# Patient Record
Sex: FEMALE | Race: OTHER
[De-identification: names, ages, dates, MRNs, and addresses within clinical notes are randomized per-mention and may not be internally consistent; named-entity substitution may affect disease eponyms.]

---

## 2019-11-08 ENCOUNTER — HOSPITAL ENCOUNTER (EMERGENCY)
Dept: HOSPITAL 63 - ER | Age: 24
Discharge: HOME | End: 2019-11-08
Payer: OTHER GOVERNMENT

## 2019-11-08 VITALS — DIASTOLIC BLOOD PRESSURE: 45 MMHG | SYSTOLIC BLOOD PRESSURE: 112 MMHG

## 2019-11-08 VITALS — BODY MASS INDEX: 20.25 KG/M2 | HEIGHT: 66 IN | WEIGHT: 126 LBS

## 2019-11-08 DIAGNOSIS — O46.91: Primary | ICD-10-CM

## 2019-11-08 DIAGNOSIS — Z3A.01: ICD-10-CM

## 2019-11-08 LAB
BASOPHILS # BLD AUTO: 0 X10^3/UL (ref 0–0.2)
BASOPHILS NFR BLD: 0 % (ref 0–3)
EOSINOPHIL NFR BLD: 0.1 X10^3/UL (ref 0–0.7)
EOSINOPHIL NFR BLD: 1 % (ref 0–3)
ERYTHROCYTE [DISTWIDTH] IN BLOOD BY AUTOMATED COUNT: 14.5 % (ref 11.5–14.5)
HCT VFR BLD CALC: 36.9 % (ref 36–47)
HGB BLD-MCNC: 12 G/DL (ref 12–15.5)
LYMPHOCYTES # BLD: 1.6 X10^3/UL (ref 1–4.8)
LYMPHOCYTES NFR BLD AUTO: 31 % (ref 24–48)
MCH RBC QN AUTO: 28 PG (ref 25–35)
MCHC RBC AUTO-ENTMCNC: 33 G/DL (ref 31–37)
MCV RBC AUTO: 86 FL (ref 79–100)
MONO #: 0.3 X10^3/UL (ref 0–1.1)
MONOCYTES NFR BLD: 5 % (ref 0–9)
NEUT #: 3.2 X10^3UL (ref 1.8–7.7)
NEUTROPHILS NFR BLD AUTO: 62 % (ref 31–73)
PLATELET # BLD AUTO: 254 X10^3/UL (ref 140–400)
RBC # BLD AUTO: 4.28 X10^6/UL (ref 3.5–5.4)
WBC # BLD AUTO: 5.1 X10^3/UL (ref 4–11)

## 2019-11-08 PROCEDURE — 84702 CHORIONIC GONADOTROPIN TEST: CPT

## 2019-11-08 PROCEDURE — 36415 COLL VENOUS BLD VENIPUNCTURE: CPT

## 2019-11-08 PROCEDURE — 85025 COMPLETE CBC W/AUTO DIFF WBC: CPT

## 2019-11-08 PROCEDURE — 76801 OB US < 14 WKS SINGLE FETUS: CPT

## 2019-11-08 NOTE — PHYS DOC
Past History


Past Medical History:  No Pertinent History


Past Surgical History:  No Surgical History


Alcohol Use:  None


Drug Use:  None





Adult General


Chief Complaint


Chief Complaint:  VAGINAL BLEEDING PREGNANCY





HPI


HPI





Patient is a 23-year-old female presenting with vaginal spotting. Has had 2 

episodes of vaginal spotting one was more significant amount of couple of days 

ago and this morning he noticed spotting when she was wiping. No abdominal pain 

she is worried because she is about 6 weeks pregnant last menstrual period 

September 25 and she notes 2 prior miscarriages. She is B+ she tells me





Review of Systems


Review of Systems





Constitutional: Denies fever or chills []


Eyes: Denies change in visual acuity, redness, or eye pain []


HENT: Denies nasal congestion or sore throat []


Respiratory: Denies cough or shortness of breath []





Musculoskeletal: Denies back pain or joint pain []


Integument: Denies rash or skin lesions []


Neurologic: Denies headache, focal weakness or sensory changes []


Endocrine: Denies polyuria or polydipsia []





All other systems were reviewed and found to be within normal limits, except as 

documented in this note.





Allergies


Allergies





Allergies








Coded Allergies Type Severity Reaction Last Updated Verified


 


  No Known Drug Allergies    11/8/19 No











Physical Exam


Physical Exam





Constitutional: Well developed, well nourished, no acute distress, non-toxic 

appearance. []


HENT: Normocephalic, atraumatic, bilateral external ears normal, oropharynx 

moist, no oral exudates, nose normal. []


Eyes: PERRLA, EOMI, conjunctiva normal, no discharge. [] 


Neck: Normal range of motion, no tenderness, supple, no stridor. [] 


Cardiovascular:Heart rate regular rhythm, no murmur []


Lungs & Thorax:  Bilateral breath sounds clear to auscultation []


Abdomen: Bowel sounds normal, soft, no tenderness, no masses, no pulsatile 

masses. [] 


Skin: Warm, dry, no erythema, no rash. [] 


Back: No tenderness, no CVA tenderness. [] 


Extremities: No tenderness, no cyanosis, no clubbing, ROM intact, no edema. [] 


Neurologic: Alert and oriented X 3, normal motor function, normal sensory 

function, no focal deficits noted. []


Psychologic: Affect normal, judgement normal, mood normal. []





Current Patient Data


Vital Signs





                                   Vital Signs








  Date Time  Temp Pulse Resp B/P (MAP) Pulse Ox O2 Delivery O2 Flow Rate FiO2


 


11/8/19 11:08 98.1 70 16  100 Room Air  





eks pregnant. Pt staetd I am G4=P1. Pt stated I have had two mischarges. Pt


   stated I am having some vaginavl bleeding and spotting.


Distress 


* Mild


Temperature (Fahrenheit): 


* 98.1 degrees F (97.6-99.5)


Patient Temperature


* 98.1 degrees F (97.5-99.5)


Temperature Source


* Oral


Blood Pressure Systolic


* 112 mm Hg (100-140)


Blood Pressure Diastolic


* 45 mm Hg () L


Blood Pressure Mean 


* 67 mm Hg


Blood Pressure Location 


* Left Arm


Blood Pressure Source 


* Automatic Cuff


Pulse Rate


* 70 beats per minute (60-90)


Pulse Assessment Method 


* NIBP


Respiratory Rate


* 16 breaths per minute (12-24)


Oxygen Delivery Method


* Room Air


Bedside Pulse Oximetry


* 100 %


Treatment Prior to Arrival 


* No


Complaint of Pain 


* No


Pain Scale Type 


* Numeric


LOC


Lab Results





                                Laboratory Tests








Test


 11/8/19


11:19


 


White Blood Count


 5.1 x10^3/uL


(4.0-11.0)


 


Red Blood Count


 4.28 x10^6/uL


(3.50-5.40)


 


Hemoglobin


 12.0 g/dL


(12.0-15.5)


 


Hematocrit


 36.9 %


(36.0-47.0)


 


Mean Corpuscular Volume


 86 fL ()





 


Mean Corpuscular Hemoglobin 28 pg (25-35)  


 


Mean Corpuscular Hemoglobin


Concent 33 g/dL


(31-37)


 


Red Cell Distribution Width


 14.5 %


(11.5-14.5)


 


Platelet Count


 254 x10^3/uL


(140-400)


 


Neutrophils (%) (Auto) 62 % (31-73)  


 


Lymphocytes (%) (Auto) 31 % (24-48)  


 


Monocytes (%) (Auto) 5 % (0-9)  


 


Eosinophils (%) (Auto) 1 % (0-3)  


 


Basophils (%) (Auto) 0 % (0-3)  


 


Neutrophils # (Auto)


 3.2 x10^3uL


(1.8-7.7)


 


Lymphocytes # (Auto)


 1.6 x10^3/uL


(1.0-4.8)


 


Monocytes # (Auto)


 0.3 x10^3/uL


(0.0-1.1)


 


Eosinophils # (Auto)


 0.1 x10^3/uL


(0.0-0.7)


 


Basophils # (Auto)


 0.0 x10^3/uL


(0.0-0.2)


 


Maternal Serum HCG Beta


Subunit 6221 mIU/mL


(0-6)  H











EKG


EKG


[]





Radiology/Procedures


Radiology/Procedures


[]


Impressions:


IMPRESSION:


1.  Small intrauterine gestational sac with estimated gestational age 5 


weeks 3 days. No fetal pole, likely related to early pregnancy. Recommend 


short-term interval ultrasound follow-up and correlation with serial 


beta-hCGs.


 


Electronically signed by: Katie Dao DO (11/8/2019 12:07 PM) [facility]














DICTATED AND SIGNED BY:     KATIE DAO DO


DATE:     11/08/19 1207





CC: JOSE ALBERTO FOX MD; PCP,UNKNOWN ~





Course & Med Decision Making


Course & Med Decision Making


Pertinent Labs and Imaging studies reviewed. (See chart for details)





[]24 old female presenting with vaginal spotting early pregnancy first trimester

 noted the ultrasound and the beta results. Patient is very well-appearing 

hemodynamically stable benign abdominal examination recommended follow-up with 

gynecology within 3-5 days for repeat beta and ultrasound to confirm that this 

is a normal pregnancy developing.





Dragon Disclaimer


Dragon Disclaimer


This electronic medical record was generated, in whole or in part, using a voice

 recognition dictation system.





Departure


Departure:


Impression:  


   Primary Impression:  


   Vaginal bleeding during pregnancy


Disposition:  01 HOME, SELF-CARE


Condition:  STABLE


Patient Instructions:  Vaginal Bleeding During Pregnancy, First Trimester





Additional Instructions:  


please see your gyn doctor for repeat ultrasound or blood work in 3-5 days.











JOSE ALBERTO FOX MD             Nov 8, 2019 13:22

## 2019-11-08 NOTE — RAD
OB <14 WKS

 

History: Vaginal bleeding. 7 weeks pregnant.

 

Comparison: None. 

 

Technique: Grayscale and color Doppler imaging of the pelvis was performed

using transabdominal  technique.

 

Findings:

The uterus measures 8.9 x 5.1 x 6.3 cm in length.

 

Small gestational sac within the upper endometrial canal measures 0.67 cm.

Estimated gestational age by ultrasound 5 weeks 3 days. No fetal pole is 

identified. No yolk sac.

 

Right ovary measures 3.4 x 2.2 x 3.2 cm and is unremarkable.  Left ovary 

measures 3.4 x 2.5 x 3.9 cm. Dominant left ovarian follicle..  No adnexal 

masses are seen.

 

Small amount of posterior cul-de-sac fluid, likely physiologic.

 

IMPRESSION:

1.  Small intrauterine gestational sac with estimated gestational age 5 

weeks 3 days. No fetal pole, likely related to early pregnancy. Recommend 

short-term interval ultrasound follow-up and correlation with serial 

beta-hCGs.

 

Electronically signed by: Wilson Sandhu DO (11/8/2019 12:07 PM) [facility]

## 2019-11-23 ENCOUNTER — HOSPITAL ENCOUNTER (EMERGENCY)
Dept: HOSPITAL 63 - ER | Age: 24
Discharge: HOME | End: 2019-11-23
Payer: OTHER GOVERNMENT

## 2019-11-23 VITALS — HEIGHT: 66 IN | BODY MASS INDEX: 19.91 KG/M2 | WEIGHT: 123.9 LBS

## 2019-11-23 VITALS — SYSTOLIC BLOOD PRESSURE: 120 MMHG | DIASTOLIC BLOOD PRESSURE: 72 MMHG

## 2019-11-23 DIAGNOSIS — O21.9: Primary | ICD-10-CM

## 2019-11-23 DIAGNOSIS — Z3A.01: ICD-10-CM

## 2019-11-23 DIAGNOSIS — R19.7: ICD-10-CM

## 2019-11-23 LAB
APTT PPP: (no result) S
BACTERIA #/AREA URNS HPF: (no result) /HPF
BILIRUB UR QL STRIP: (no result)
FIBRINOGEN PPP-MCNC: (no result) MG/DL
GLUCOSE UR STRIP-MCNC: (no result) MG/DL
NITRITE UR QL STRIP: (no result)
RBC #/AREA URNS HPF: (no result) /HPF (ref 0–2)
SP GR UR STRIP: >=1.03
SQUAMOUS #/AREA URNS LPF: (no result) /LPF
UROBILINOGEN UR-MCNC: 0.2 MG/DL
WBC #/AREA URNS HPF: (no result) /HPF (ref 0–4)

## 2019-11-23 PROCEDURE — 87086 URINE CULTURE/COLONY COUNT: CPT

## 2019-11-23 PROCEDURE — 99283 EMERGENCY DEPT VISIT LOW MDM: CPT

## 2019-11-23 PROCEDURE — 81001 URINALYSIS AUTO W/SCOPE: CPT

## 2019-11-23 NOTE — PHYS DOC
Past History


Past Medical History:  No Pertinent History


Past Surgical History:  No Surgical History


Alcohol Use:  None


Drug Use:  None





Adult General


Chief Complaint


Chief Complaint:  VOMITING IN PREGNANCY





HPI


HPI





Patient is a 25 yo f p/w n/v/d.


Vomiting profusely since midnight also had some diarrhea yesterday daughter was 

sick with the same symptoms patient is 7 weeks pregnant and has no fever. Did 

have some spotting on Thursday saw her doctor had ultrasound and everything 

looked good at that time. No further bleeding this morning patient describes a 

sensation of abdominal gurgling but no sharp pain





Review of Systems


Review of Systems





Constitutional: Denies fever or chills []


Eyes: Denies change in visual acuity, redness, or eye pain []


HENT: Denies nasal congestion or sore throat []


Respiratory: Denies cough or shortness of breath []


Cardiovascular:


Integument: Denies rash or skin lesions []


Neurologic: Denies headache, focal weakness or sensory changes []








All other systems were reviewed and found to be within normal limits, except as 

documented in this note.





Current Medications


Current Medications





Current Medications








 Medications


  (Trade)  Dose


 Ordered  Sig/Neelima  Start Time


 Stop Time Status Last Admin


Dose Admin


 


 Ondansetron HCl


  (Zofran Odt)  4 mg  STK-MED ONCE  11/23/19 07:55


 11/23/19 07:55 DC  














Allergies


Allergies





Allergies








Coded Allergies Type Severity Reaction Last Updated Verified


 


  No Known Drug Allergies    11/8/19 No











Physical Exam


Physical Exam





Constitutional: Well developed, well nourished, no acute distress, non-toxic 

appearance. []


HENT: Normocephalic, atraumatic, bilateral external ears normal, oropharynx 

moist, no oral exudates, nose normal. []


Eyes: PERRLA, EOMI, conjunctiva normal, no discharge. [] 


Neck: Normal range of motion, no tenderness, supple, no stridor. [] 


Pulmonary: Normal respiratory effort no increased work of breathing no obvious 

chest wall trauma


Abdomen: Bowel sounds normal, soft, no tenderness, no masses, no pulsatile 

masses. [] 


Skin: Warm, dry, no erythema, no rash. [] 


Back: No tenderness, no CVA tenderness. [] 


Extremities: No tenderness, no cyanosis, no clubbing, ROM intact, no edema. [] 


Neurologic: Alert and oriented X 3, normal motor function, normal sensory 

function, no focal deficits noted. []


Psychologic: Affect normal, judgement normal, mood normal. []





Current Patient Data


Vital Signs





                                   Vital Signs








  Date Time  Temp Pulse Resp B/P (MAP) Pulse Ox O2 Delivery O2 Flow Rate FiO2


 


11/23/19 07:40 98.7 77 18 120/72 (88) 100 Room Air  








Lab Results





                                Laboratory Tests








Test


 11/23/19


08:13


 


Urine Collection Type Unknown  


 


Urine Color Anna  


 


Urine Clarity Hazy  


 


Urine pH 5.5  


 


Urine Specific Gravity >=1.030  


 


Urine Protein


 Neg


(NEG-TRACE)


 


Urine Glucose (UA)


 Neg mg/dL


(NEG)


 


Urine Ketones (Stick)


 Neg mg/dL


(NEG)


 


Urine Blood Neg (NEG)  


 


Urine Nitrite Neg (NEG)  


 


Urine Bilirubin Neg (NEG)  


 


Urine Urobilinogen Dipstick


 0.2 mg/dL (0.2


mg/dL)


 


Urine Leukocyte Esterase Trace (NEG)  


 


Urine RBC


 Occ /HPF (0-2)





 


Urine WBC


 1-4 /HPF (0-4)





 


Urine Squamous Epithelial


Cells Mod /LPF  





 


Urine Bacteria


 Few /HPF


(0-FEW)


 


Urine Mucus Mod /LPF  











EKG


EKG


[]





Radiology/Procedures


Radiology/Procedures


[]





Course & Med Decision Making


Course & Med Decision Making


Pertinent Labs and Imaging studies reviewed. (See chart for details)





[]Patient was improved in the emergency room after oral Zofran urinalysis 

negative for infection vitals normal discharge in stable condition abdomen was 

benign and nontender





Dragon Disclaimer


Dragon Disclaimer


This electronic medical record was generated, in whole or in part, using a voice

 recognition dictation system.





Departure


Departure:


Impression:  


   Primary Impression:  


   Vomiting


Disposition:  01 HOME, SELF-CARE


Condition:  STABLE


Patient Instructions:  Nausea and Vomiting, Easy-to-Read


Scripts


Ondansetron (ONDANSETRON ODT) 4 Mg Tab.rapdis


1 TAB PO PRN Q6-8HRS PRN for PAIN, #16 TAB


   Prov: JOSE ALBERTO FOX MD         11/23/19











JOSE ALBERTO FOX MD            Nov 23, 2019 09:00

## 2021-07-28 ENCOUNTER — HOSPITAL ENCOUNTER (EMERGENCY)
Dept: HOSPITAL 63 - ER | Age: 26
Discharge: HOME | End: 2021-07-28
Payer: OTHER GOVERNMENT

## 2021-07-28 VITALS — WEIGHT: 125.22 LBS | HEIGHT: 63 IN | BODY MASS INDEX: 22.19 KG/M2

## 2021-07-28 VITALS — SYSTOLIC BLOOD PRESSURE: 116 MMHG | DIASTOLIC BLOOD PRESSURE: 73 MMHG

## 2021-07-28 DIAGNOSIS — O46.91: Primary | ICD-10-CM

## 2021-07-28 DIAGNOSIS — Z3A.01: ICD-10-CM

## 2021-07-28 LAB
ALBUMIN SERPL-MCNC: 4 G/DL (ref 3.4–5)
ALBUMIN/GLOB SERPL: 1.2 {RATIO} (ref 1–1.7)
ALP SERPL-CCNC: 73 U/L (ref 46–116)
ALT SERPL-CCNC: 25 U/L (ref 14–59)
ANION GAP SERPL CALC-SCNC: 5 MMOL/L (ref 6–14)
APTT PPP: YELLOW S
AST SERPL-CCNC: 11 U/L (ref 15–37)
BACTERIA #/AREA URNS HPF: 0 /HPF
BASOPHILS # BLD AUTO: 0 X10^3/UL (ref 0–0.2)
BASOPHILS NFR BLD: 0 % (ref 0–3)
BILIRUB SERPL-MCNC: 1.1 MG/DL (ref 0.2–1)
BILIRUB UR QL STRIP: (no result)
BUN/CREAT SERPL: 19 (ref 6–20)
CA-I SERPL ISE-MCNC: 15 MG/DL (ref 7–20)
CALCIUM SERPL-MCNC: 8.7 MG/DL (ref 8.5–10.1)
CHLORIDE SERPL-SCNC: 105 MMOL/L (ref 98–107)
CO2 SERPL-SCNC: 29 MMOL/L (ref 21–32)
CREAT SERPL-MCNC: 0.8 MG/DL (ref 0.6–1)
EOSINOPHIL NFR BLD: 0.1 X10^3/UL (ref 0–0.7)
EOSINOPHIL NFR BLD: 2 % (ref 0–3)
ERYTHROCYTE [DISTWIDTH] IN BLOOD BY AUTOMATED COUNT: 13.5 % (ref 11.5–14.5)
FIBRINOGEN PPP-MCNC: (no result) MG/DL
GFR SERPLBLD BASED ON 1.73 SQ M-ARVRAT: 86.7 ML/MIN
GLOBULIN SER-MCNC: 3.4 G/DL (ref 2.2–3.8)
GLUCOSE SERPL-MCNC: 77 MG/DL (ref 70–99)
GLUCOSE UR STRIP-MCNC: (no result) MG/DL
HCT VFR BLD CALC: 37.1 % (ref 36–47)
HGB BLD-MCNC: 12.5 G/DL (ref 12–15.5)
LYMPHOCYTES # BLD: 1.6 X10^3/UL (ref 1–4.8)
LYMPHOCYTES NFR BLD AUTO: 25 % (ref 24–48)
MCH RBC QN AUTO: 30 PG (ref 25–35)
MCHC RBC AUTO-ENTMCNC: 34 G/DL (ref 31–37)
MCV RBC AUTO: 88 FL (ref 79–100)
MONO #: 0.4 X10^3/UL (ref 0–1.1)
MONOCYTES NFR BLD: 6 % (ref 0–9)
NEUT #: 4.4 X10^3UL (ref 1.8–7.7)
NEUTROPHILS NFR BLD AUTO: 68 % (ref 31–73)
NITRITE UR QL STRIP: (no result)
PLATELET # BLD AUTO: 288 X10^3/UL (ref 140–400)
POTASSIUM SERPL-SCNC: 3.9 MMOL/L (ref 3.5–5.1)
PROT SERPL-MCNC: 7.4 G/DL (ref 6.4–8.2)
RBC # BLD AUTO: 4.21 X10^6/UL (ref 3.5–5.4)
RBC #/AREA URNS HPF: 0 /HPF (ref 0–2)
SODIUM SERPL-SCNC: 139 MMOL/L (ref 136–145)
SP GR UR STRIP: 1.02
SQUAMOUS #/AREA URNS LPF: (no result) /LPF
UROBILINOGEN UR-MCNC: 1 MG/DL
WBC # BLD AUTO: 6.5 X10^3/UL (ref 4–11)
WBC #/AREA URNS HPF: 0 /HPF (ref 0–4)

## 2021-07-28 PROCEDURE — 76817 TRANSVAGINAL US OBSTETRIC: CPT

## 2021-07-28 PROCEDURE — 85025 COMPLETE CBC W/AUTO DIFF WBC: CPT

## 2021-07-28 PROCEDURE — 86901 BLOOD TYPING SEROLOGIC RH(D): CPT

## 2021-07-28 PROCEDURE — 84702 CHORIONIC GONADOTROPIN TEST: CPT

## 2021-07-28 PROCEDURE — 86900 BLOOD TYPING SEROLOGIC ABO: CPT

## 2021-07-28 PROCEDURE — 76801 OB US < 14 WKS SINGLE FETUS: CPT

## 2021-07-28 PROCEDURE — 80053 COMPREHEN METABOLIC PANEL: CPT

## 2021-07-28 PROCEDURE — 81001 URINALYSIS AUTO W/SCOPE: CPT

## 2021-07-28 PROCEDURE — 99284 EMERGENCY DEPT VISIT MOD MDM: CPT

## 2021-07-28 PROCEDURE — 81025 URINE PREGNANCY TEST: CPT

## 2021-07-28 PROCEDURE — 86850 RBC ANTIBODY SCREEN: CPT

## 2021-07-28 PROCEDURE — 36415 COLL VENOUS BLD VENIPUNCTURE: CPT

## 2021-07-28 NOTE — PHYS DOC
Past History


Past Medical History:  No Pertinent History


 (NADIA BAIRD)


Past Surgical History:  Other


Additional Past Surgical Histo:  D&C


 (NADIA BAIRD)


Alcohol Use:  None


Drug Use:  None


 (NADIA BAIRD)





General Adult


EDM:


Chief Complaint:  VAGINAL BLEEDING PREGNANCY





HPI:


HPI:





Patient is a 26-year-old female who presents with vaginal bleeding while 

pregnant.  Patient states that she took a pregnancy test last week which was 

negative.  "On Sunday started bleeding and he assumed I was only.,  But on 

Monday the bleeding had quit so I took 2 more test which were both positive".  

"I also had quite a bit of cramping on Sunday".  Last menstrual period was 6/18.

 G5, P2.  Denies medical history.


 (NADIA BAIRD)





Review of Systems:


Review of Systems:


Constitutional: Denies fever and chills


Eyes:  Denies change in visual acuity 


HENT:  Denies nasal congestion or sore throat 


Respiratory:  Denies cough or shortness of breath 


Cardiovascular:  Denies chest pain or edema 


GI: Reports abdominal cramping.  Denies nausea or/vomiting


/Vaginal: Reports vaginal bleeding while pregnant.  Denies vaginal discharge 

or odor


Musculoskeletal:  Denies back pain or joint pain 


Integument:  Denies rash 


Neurologic:  Denies headache, focal weakness or sensory changes 


Endocrine:  Denies polyuria or polydipsia 


Lymphatic:  Denies swollen glands 


Psychiatric:  Denies depression or anxiety


 (NADIA BAIRD)





Allergies:


Allergies:





Allergies








Coded Allergies Type Severity Reaction Last Updated Verified


 


  No Known Drug Allergies    11/8/19 No








 (NADIA BAIRD)





Physical Exam:


PE:





Constitutional: Well developed, well nourished, no acute distress, non-toxic 

appearance. []


HENT: Normocephalic, atraumatic, bilateral external ears normal, oropharynx 

moist, no oral exudates, nose normal. []


Eyes: PERRLA, EOMI, conjunctiva normal, no discharge. [] 


Neck: Normal range of motion, no tenderness, supple, no stridor. [] 


Cardiovascular:Heart rate regular rhythm, no murmur []


Lungs & Thorax:  Bilateral breath sounds clear to auscultation []


Abdomen: Bowel sounds normal, soft, no tenderness, no masses, no pulsatile 

masses. [] 


Skin: Warm, dry, no erythema, no rash. [] 


Back: No tenderness, no CVA tenderness. [] 


Extremities: No tenderness, no cyanosis, no clubbing, ROM intact, no edema. [] 


Neurologic: Alert and oriented X 3, normal motor function, normal sensory 

function, no focal deficits noted. []


Psychologic: Affect normal, judgement normal, mood normal. []


 (NADIA BAIRD)





Current Patient Data:


Labs:





                                Laboratory Tests








Test


 7/28/21


17:14


 


POC Urine HCG, Qualitative


 hcg positive


(Negative)








Vital Signs:





                                   Vital Signs








  Date Time  Temp Pulse Resp B/P (MAP) Pulse Ox O2 Delivery O2 Flow Rate FiO2


 


7/28/21 17:12 98.0 77 16 105/76 100 Room Air  








 (NADIA BAIRD)





EKG:


EKG:


[]


 (NADIA BAIRD)





Radiology/Procedures:


Radiology/Procedures:


[]Obstetric ultrasound less than 14 weeks with transvaginal:





Reason for examination: Vaginal bleeding with pregnancy.





Transabdominal and transvaginal ultrasound examination of the pelvis was 

performed.





Uterus measures 10.0 x 5.7 cm in greatest dimensions. No uterine mass is seen. 

Cervix length is normal at 4.7 cm. The endometrium is not thickened at 9 mm. No 

intrauterine or ectopic gestation is identified.





Right ovary measures 3.3 x 1.8 x 1.9 cm in greatest dimension and shows good 

vascular flow and no mass.





Left ovary measures 3.2 x 2.7 x 1.8 cm in greatest dimension and shows good 

vascular flow and no mass.





No free fluid is identified in the pelvis.





IMPRESSION:





No evidence of intrauterine or ectopic gestation.


Recommend clinical correlation and follow-up.





Electronically signed by: Jeanine Naylor MD (7/28/2021 5:56 PM) [facility]





 (NADIA BAIRD)





Heart Score:


C/O Chest Pain:  No


Risk Factors:


Risk Factors:  DM, Current or recent (<one month) smoker, HTN, HLP, family his

tory of CAD, obesity.


Risk Scores:


Score 0 - 3:  2.5% MACE over next 6 weeks - Discharge Home


Score 4 - 6:  20.3% MACE over next 6 weeks - Admit for Clinical Observation


Score 7 - 10:  72.7% MACE over next 6 weeks - Early Invasive Strategies


 (NADIA BAIRD)





Course & Med Decision Making:


Course & Med Decision Making


Pertinent Labs and Imaging studies reviewed. (See chart for details)





[] 26-year-old female presents with vaginal bleeding while pregnant.  Patient 

denies pain.  Last menstrual period.  Was 6/18.  G5, P2.





Urine preg was positive.  Ultrasound shows No evidence of intrauterine or 

ectopic gestation.  Instructed patient to make a follow-up appointment with GYN 

OR return to the emergency room for repeat quant levels in 48 hours.  Current 

beta-hCG levels are 39.





All labs unremarkable.  Patient given strict return precautions for increasing 

pain, bleeding.





 (NADIA BAIRD)


Course & Med Decision Making


Did not see or evaluate patient.  Agree with NP's work-up and disposition per 

note


 (VON PLASCENCIA MD)


Dragon Disclaimer:


Dragon Disclaimer:


This electronic medical record was generated, in whole or in part, using a voice

 recognition dictation system.


 (NADIA BAIRD)





Departure


Departure:


Impression:  


   Primary Impression:  


   Vaginal bleeding during pregnancy


Disposition:   HOME / SELF CARE / HOMELESS


Condition:  STABLE


Referrals:  


JUAN ANTONIO GRACE DO (PCP)


Patient Instructions:  Vaginal Bleeding During Pregnancy, Easy-to-Read





Additional Instructions:  


You were seen in the emergency room for vaginal bleeding while pregnant.  The 

ultrasound was negative for ectopic but did not see a intrauterine pregnancy at 

this time and could be because it is too soon.  Please follow-up with GYN or 

return to the ER for repeat beta quant levels.  Your current levels were 39.  

Return to the emergency room if you have an increase in pain or bleeding.





EMERGENCY DEPARTMENT GENERAL DISCHARGE INSTRUCTIONS





Thank you for coming to [facility] (ED) today and trusting us

 with you 


care.  We trust that you had a positivie experience in our Emergency Department.

  If you 


wish to speak to the department management, you may call the director at 

[phone].





YOUR FOLLOW UP INSTRUCTIONS ARE AS FOLLOWS:





1.  Do you have a private Doctor?  If you do not have a private doctor, please 

ask for a 


resource list of physicians or clinics that may be able to assist you with 

follow up care.





2.  The Emergency Physician has interpreted your x-rays.  The X-Ray specialist 

will also 


review them.  If there is a change in the findings, you will be notified in 48 

hours when at 


all possible.





3.  A lab test or culture has been done, your results will be reviewed and you 

will be 


notified if you need a change in treatment.





ADDITIONAL INSTRUCTIONS AND INFORMATION:





1.  Your care today has been supervised by a physician who is specially trained 

in emergency 


care.  Many problems require more than one evaluation for a complete diagnosis 

and 


treatment.  We recommend that you schedule your follow up appointment as 

recommended to 


ensure complete treatment of you illness or injury.  If you are unable to obtain

 follow up 


care and continue to have a problem, or if your condition worsens, we recommend 

that you 


return to the ED.





2.  We are not able to safely determine your condition over the phone nor are we

 able to 


give sound medical advice over the phone.  For these safety reasons, if you call

 for medical 


advice we will ask you to come to the ED for further evaluation.





3.  If you have any questions regarding these discharge instructions please call

 the ED at 


[phone].





SAFETY INFORMATION:





In the interest of safety, wellness, and injury prevention; we encourage you to 

wear your 


sealbelt, if you smoke; quite smoking, and we encourage family to use a 

protective helmet 


for bicycling and other sporting events that present an increased risk for head 

injury.





IF YOUR SYMPTOMS WORSEN OR NEW SYMPTOMS DEVELOP, OR YOU HAVE CONCERNS ABOUT YOUR

 CONDITION; 


OR IF YOUR CONDITION WORSENS WHILE YOU ARE WAITING FOR YOUR FOLLOW UP 

APPOINTMENT; EITHER 


CONTACT YOUR PRIMARY CARE DOCTOR, THE PHYSICIAN WHOSE NAME AND NUMBER YOU WERE 

GIVEN, OR 


RETURN TO THE ED IMMEDIATELY.











NADIA BAIRD               Jul 28, 2021 17:47


VON PLASCENCIA MD               Jul 29, 2021 00:39

## 2021-07-28 NOTE — RAD
Obstetric ultrasound less than 14 weeks with transvaginal:



Reason for examination: Vaginal bleeding with pregnancy.



Transabdominal and transvaginal ultrasound examination of the pelvis was performed.



Uterus measures 10.0 x 5.7 cm in greatest dimensions. No uterine mass is seen. Cervix length is jose juan
l at 4.7 cm. The endometrium is not thickened at 9 mm. No intrauterine or ectopic gestation is identi
fied.



Right ovary measures 3.3 x 1.8 x 1.9 cm in greatest dimension and shows good vascular flow and no mas
s.



Left ovary measures 3.2 x 2.7 x 1.8 cm in greatest dimension and shows good vascular flow and no mass
.



No free fluid is identified in the pelvis.



IMPRESSION:



No evidence of intrauterine or ectopic gestation.

Recommend clinical correlation and follow-up.



Electronically signed by: Jeanine Naylor MD (7/28/2021 5:56 PM) [facility]

## 2022-02-18 ENCOUNTER — HOSPITAL ENCOUNTER (EMERGENCY)
Dept: HOSPITAL 63 - ER | Age: 27
Discharge: HOME | End: 2022-02-18
Payer: OTHER GOVERNMENT

## 2022-02-18 VITALS — WEIGHT: 123.46 LBS | HEIGHT: 63 IN | BODY MASS INDEX: 21.88 KG/M2

## 2022-02-18 VITALS — DIASTOLIC BLOOD PRESSURE: 64 MMHG | SYSTOLIC BLOOD PRESSURE: 111 MMHG

## 2022-02-18 DIAGNOSIS — Z3A.08: ICD-10-CM

## 2022-02-18 DIAGNOSIS — O46.91: Primary | ICD-10-CM

## 2022-02-18 LAB
ANION GAP SERPL CALC-SCNC: 8 MMOL/L (ref 6–14)
APTT PPP: YELLOW S
BACTERIA #/AREA URNS HPF: (no result) /HPF
BASOPHILS # BLD AUTO: 0 X10^3/UL (ref 0–0.2)
BASOPHILS NFR BLD: 0 % (ref 0–3)
CA-I SERPL ISE-MCNC: 17 MG/DL (ref 7–20)
CALCIUM SERPL-MCNC: 8.9 MG/DL (ref 8.5–10.1)
CHLORIDE SERPL-SCNC: 100 MMOL/L (ref 98–107)
CO2 SERPL-SCNC: 26 MMOL/L (ref 21–32)
CREAT SERPL-MCNC: 0.5 MG/DL (ref 0.6–1)
EOSINOPHIL NFR BLD: 0.1 X10^3/UL (ref 0–0.7)
EOSINOPHIL NFR BLD: 1 % (ref 0–3)
ERYTHROCYTE [DISTWIDTH] IN BLOOD BY AUTOMATED COUNT: 13.5 % (ref 11.5–14.5)
FIBRINOGEN PPP-MCNC: (no result) MG/DL
GFR SERPLBLD BASED ON 1.73 SQ M-ARVRAT: 149.1 ML/MIN
GLUCOSE SERPL-MCNC: 90 MG/DL (ref 70–99)
GLUCOSE UR STRIP-MCNC: (no result) MG/DL
HCT VFR BLD CALC: 35.6 % (ref 36–47)
HGB BLD-MCNC: 11.9 G/DL (ref 12–15.5)
LYMPHOCYTES # BLD: 1.7 X10^3/UL (ref 1–4.8)
LYMPHOCYTES NFR BLD AUTO: 30 % (ref 24–48)
MCH RBC QN AUTO: 30 PG (ref 25–35)
MCHC RBC AUTO-ENTMCNC: 33 G/DL (ref 31–37)
MCV RBC AUTO: 89 FL (ref 79–100)
MONO #: 0.4 X10^3/UL (ref 0–1.1)
MONOCYTES NFR BLD: 7 % (ref 0–9)
NEUT #: 3.4 X10^3UL (ref 1.8–7.7)
NEUTROPHILS NFR BLD AUTO: 62 % (ref 31–73)
NITRITE UR QL STRIP: (no result)
PLATELET # BLD AUTO: 263 X10^3/UL (ref 140–400)
POTASSIUM SERPL-SCNC: 3.8 MMOL/L (ref 3.5–5.1)
RBC # BLD AUTO: 4.01 X10^6/UL (ref 3.5–5.4)
RBC #/AREA URNS HPF: (no result) /HPF (ref 0–2)
SODIUM SERPL-SCNC: 134 MMOL/L (ref 136–145)
SP GR UR STRIP: 1.02
SQUAMOUS #/AREA URNS LPF: (no result) /LPF
UROBILINOGEN UR-MCNC: 0.2 MG/DL
WBC # BLD AUTO: 5.5 X10^3/UL (ref 4–11)

## 2022-02-18 PROCEDURE — 86901 BLOOD TYPING SEROLOGIC RH(D): CPT

## 2022-02-18 PROCEDURE — 99284 EMERGENCY DEPT VISIT MOD MDM: CPT

## 2022-02-18 PROCEDURE — 87086 URINE CULTURE/COLONY COUNT: CPT

## 2022-02-18 PROCEDURE — 87491 CHLMYD TRACH DNA AMP PROBE: CPT

## 2022-02-18 PROCEDURE — 85025 COMPLETE CBC W/AUTO DIFF WBC: CPT

## 2022-02-18 PROCEDURE — 76817 TRANSVAGINAL US OBSTETRIC: CPT

## 2022-02-18 PROCEDURE — 36415 COLL VENOUS BLD VENIPUNCTURE: CPT

## 2022-02-18 PROCEDURE — 96360 HYDRATION IV INFUSION INIT: CPT

## 2022-02-18 PROCEDURE — 87591 N.GONORRHOEAE DNA AMP PROB: CPT

## 2022-02-18 PROCEDURE — 86900 BLOOD TYPING SEROLOGIC ABO: CPT

## 2022-02-18 PROCEDURE — 81001 URINALYSIS AUTO W/SCOPE: CPT

## 2022-02-18 PROCEDURE — 84702 CHORIONIC GONADOTROPIN TEST: CPT

## 2022-02-18 PROCEDURE — 81025 URINE PREGNANCY TEST: CPT

## 2022-02-18 PROCEDURE — 80048 BASIC METABOLIC PNL TOTAL CA: CPT

## 2022-02-18 PROCEDURE — 76801 OB US < 14 WKS SINGLE FETUS: CPT

## 2022-02-18 NOTE — RAD
INDICATION: Reason: vaginal bleeding in pregnancy,                      



COMPARISON: July 2021



TECHNIQUE: Grayscale and color ultrasound images of the pelvis.  Transvaginal images are obtained.



FINDINGS: 



Uterus: 85 x 75 x 49 mm.

Intrauterine gestational sac is seen and unremarkable.  Too early in gestation to adequately assess p
lacenta.

Fetal pole seen.

CRL: 12 mm.

Estimated Gestational Age: 7 weeks and 3 days

Heart Beat: 162



Right Ovary: 41 x 30 x 24 mm.  

Left Ovary: 35 x 35 x 20 mm. 

Vascular flow identified to bilateral ovaries.





IMPRESSION: 



*   Intrauterine pregnancy is seen with positive heart beat.  

*  Recommend routine anomaly screening at 18-22 weeks.



Electronically signed by: Jonathon Beaver MD (2/18/2022 8:23 PM) [id number]

## 2022-02-18 NOTE — PHYS DOC
Past History


Past Medical History:  No Pertinent History


 (BELTRAN UNGER)


Past Surgical History:  Other


Additional Past Surgical Histo:  D&C


 (BELTRAN UNGER)


Alcohol Use:  None


Drug Use:  None


 (BELTRAN UNGER)





General Adult


EDM:


Chief Complaint:  VAGINAL BLEEDING PREGNANCY





HPI:


HPI:


Patient is a 26-year-old female who presents to the emergency department for 

vaginal bleeding and pregnancy.  Patient reports that she has had spotting for 

the last 3 weeks that has improved.  She reports the vaginal bleeding is light 

red to brown in color.  There are no clots.  She is not saturating any pads.  

Her last menstrual period was December 23.  Her OB/GYN is Dr. Rice.  She 

reports nausea without vomiting, denies abdominal pain.  She reports prior to 

the vaginal spotting she did have sexual intercourse.  Patient has no STI 

concern.  She denies any vaginal discharge, itching or odor.  G5, P2 with hi

story of miscarriage.


 (BELTRAN UNGER)





Review of Systems:


Review of Systems:





GI: See HPI


: See HPI


Musculoskeletal: Denies flank pain


 (BELTRAN UNGER)





Current Medications:


Current Meds:





Current Medications








 Medications


  (Trade)  Dose


 Ordered  Sig/Neelima  Start Time


 Stop Time Status Last Admin


Dose Admin


 


 Sodium Chloride  1,000 ml @ 


 1,000 mls/hr  1X  ONCE  2/18/22 18:15


 2/18/22 19:14 UNV  











 (BELTRAN UNGER)





Allergies:


Allergies:





Allergies








Coded Allergies Type Severity Reaction Last Updated Verified


 


  No Known Drug Allergies    11/8/19 No








 (BELTRAN UNGER)





Physical Exam:


PE:





Constitutional: Well developed, well nourished, no acute distress, non-toxic 

appearance. []


HENT: Normocephalic, atraumatic, bilateral external ears normal, oropharynx 

moist, no oral exudates, nose normal. []


Eyes: PERRL, EOMI, conjunctiva normal, no discharge. [] 


Neck: Normal range of motion, no stridor


Cardiovascular:Heart rate regular rhythm, no murmur []


Lungs & Thorax:  Bilateral breath sounds clear to auscultation []


Abdomen: Bowel sounds normal, soft, no tenderness, abdominal rigidity or 

guarding, negative Smith sign, no rebound tenderness, no masses, no pulsatile 

masses. [] 


Skin: Warm, dry, no erythema, no rash. [] 


Back: Normal range of motion


Extremities: No tenderness, no cyanosis, no clubbing, ROM intact, no edema. [] 


Neurologic: Alert and oriented X 3, normal motor function, normal sensory 

function, no focal deficits noted. []


Psychologic: Affect normal, judgement normal, mood normal. []


 (BELTRAN UNGER)





Current Patient Data:


Labs:





Laboratory Tests








Test


 2/18/22


18:08 2/18/22


18:20 2/18/22


18:32


 


Urine Collection Type Clean catch   


 


Urine Color Yellow   


 


Urine Clarity Hazy   


 


Urine pH 6.0   


 


Urine Specific Gravity 1.025   


 


Urine Protein Neg   


 


Urine Glucose (UA) Neg mg/dL   


 


Urine Ketones (Stick) Neg mg/dL   


 


Urine Blood Mod   


 


Urine Nitrite Neg   


 


Urine Bilirubin Neg   


 


Urine Urobilinogen Dipstick 0.2 mg/dL   


 


Urine Leukocyte Esterase Mod   


 


Urine RBC 6-10 /HPF   


 


Urine WBC 11-20 /HPF   


 


Urine Squamous Epithelial


Cells Many /LPF 


 


 





 


Urine Bacteria Few /HPF   


 


Bedside Urine HCG, Qualitative  hcg positive  


 


White Blood Count   5.5 x10^3/uL 


 


Red Blood Count   4.01 x10^6/uL 


 


Hemoglobin   11.9 g/dL 


 


Hematocrit   35.6 % 


 


Mean Corpuscular Volume   89 fL 


 


Mean Corpuscular Hemoglobin   30 pg 


 


Mean Corpuscular Hemoglobin


Concent 


 


 33 g/dL 





 


Red Cell Distribution Width   13.5 % 


 


Platelet Count   263 x10^3/uL 


 


Neutrophils (%) (Auto)   62 % 


 


Lymphocytes (%) (Auto)   30 % 


 


Monocytes (%) (Auto)   7 % 


 


Eosinophils (%) (Auto)   1 % 


 


Basophils (%) (Auto)   0 % 


 


Neutrophils # (Auto)   3.4 x10^3uL 


 


Lymphocytes # (Auto)   1.7 x10^3/uL 


 


Monocytes # (Auto)   0.4 x10^3/uL 


 


Eosinophils # (Auto)   0.1 x10^3/uL 


 


Basophils # (Auto)   0.0 x10^3/uL 


 


Maternal Serum HCG Beta


Subunit 


 


 130214 mIU/mL 





 


Sodium Level   134 mmol/L 


 


Potassium Level   3.8 mmol/L 


 


Chloride Level   100 mmol/L 


 


Carbon Dioxide Level   26 mmol/L 


 


Anion Gap   8 


 


Blood Urea Nitrogen   17 mg/dL 


 


Creatinine   0.5 mg/dL 


 


Estimated GFR


(Cockcroft-Gault) 


 


 149.1 





 


Glucose Level   90 mg/dL 


 


Calcium Level   8.9 mg/dL 








Current Medications








 Medications


  (Trade)  Dose


 Ordered  Sig/Neelima


 Route


 PRN Reason  Start Time


 Stop Time Status Last Admin


Dose Admin


 


 Sodium Chloride  1,000 ml @ 


 1,000 mls/hr  1X  ONCE


 IV


   2/18/22 18:15


 2/18/22 19:14 DC 2/18/22 18:15











 (BELTRAN UNGER)





EKG:


EKG:


[]


 (BELTRAN UNGER)





Radiology/Procedures:


Radiology/Procedures:


Pelvic exam:


external genitalia: no redness/wounds/lesions noted


speculum exam: brown vaginal bleeding, no foreign bodies noted, cultures 

obtained, no tenderness[]


 (BELTRAN UNGER)





Heart Score:


C/O Chest Pain:  N/A


Risk Factors:


Risk Factors:  DM, Current or recent (<one month) smoker, HTN, HLP, family 

history of CAD, obesity.


Risk Scores:


Score 0 - 3:  2.5% MACE over next 6 weeks - Discharge Home


Score 4 - 6:  20.3% MACE over next 6 weeks - Admit for Clinical Observation


Score 7 - 10:  72.7% MACE over next 6 weeks - Early Invasive Strategies


 (BELTRAN UNGER)





Course & Med Decision Making:


Course & Med Decision Making


Pertinent Labs and Imaging studies reviewed. (See chart for details)





[] Patient presents to the emergency department for vaginal spotting and 

pregnancy that has been going on for 3 weeks.  Last menstrual period December 23.  Approximately 8 weeks pregnant.  Patient denies any abdominal pain, urinary

 symptoms, vaginal discharge, vaginal itching or odor.  Work-up in the ER 

consisted of blood work, urinalysis and ultrasound.


CBC and BMP were unremarkable.  Urinalysis showed a urinary tract infection.  

Patient's blood type is B+, she is not require RhoGam.  Ultrasound shows an 

intrauterine sac and fetal pole measuring 7 weeks and 3 days, fetal heart rates 

162 with good blood flow to both ovaries.  Her beta quant is [id number].  Patient 

will be treated with an antibiotic for her UTI and metronidazole for bacterial 

vaginosis.  She is advised to follow-up with her OB/GYN.  I discussed with 

patient all findings and diagnostic testing as well as the need to follow-up 

with PCP for further evaluation and treatment or return to the ER if any new or 

worsening symptoms.  Strict return precautions were also discussed at length.  

Patient voiced understanding and agreement with the plan.  Patient is 

hemodynamically stable at the time of disposition.


 (BELTRAN UNGER)





Dragon Disclaimer:


Dragon Disclaimer:


This electronic medical record was generated, in whole or in part, using a voice

 recognition dictation system.


 (BELTRAN UNGER)





Departure


Departure:


Impression:  


   Primary Impression:  


   Vaginal bleeding during pregnancy


Disposition:  01 HOME / SELF CARE / HOMELESS


Condition:  GOOD


Referrals:  


BLADIMIR SMITH (PCP)


Patient Instructions:  ABCs of Pregnancy, Vaginal Bleeding During Pregnancy, 

First Trimester





Additional Instructions:  


You were seen in the emergency department today for vaginal bleeding and 

pregnancy.  You were noted to have a urinary tract infection which will be 

treated with an antibiotic.  Please start and finish the antibiotic completely. 

 Increase your fluids and avoid any bladder irritants like caffeine and sugary 

beverages.  You are also noted to be positive for bacterial vaginosis which is 

an overgrowth of our normal bacteria in her vagina.  This will be treated with 

metronidazole.  You were noted to have an intrauterine sac measuring 7 weeks and

 3 days.  Your beta hCG level was [id number].  Please follow-up with your OB/GYN in 

2 days to have this level rechecked.  Return to the emergency department if you 

develop worsening of your vaginal bleeding where you are saturating more than 1 

pad an hour, abdominal pain, intractable nausea or vomiting, high fevers 

refractory to treatment or any new or worsening concerns.


Scripts


Metronidazole (METRONIDAZOLE) 500 Mg Tablet


1 TAB PO BID for bacterial infection for 7 Days, #14 TAB 0 Refills


   Prov: BELTRAN UNGER         2/18/22 


Cephalexin (KEFLEX) 500 Mg Capsule


1 CAP PO TID for uti for 7 Days, #21 CAP 0 Refills


   Prov: BELTRAN UNGER         2/18/22





Attending Signature


Attending Signature


I have participated in the care of this patient and I have reviewed and agree 

with all pertinent clinical information above including history, exam, and 

recommendations.





 (JAILENE RIVERO MD)





Dragon Disclaimer


This chart was dictated in whole or in part using Voice Recognition software in 

a busy, high-work load, and often noisy Emergency Department environment.  It 

may contain unintended and wholly unrecognized errors or omissions.


 (JAILENE RIVERO MD)











BELTRAN UNGER          Feb 18, 2022 18:22


JAILENE RIVERO MD           Feb 20, 2022 05:30

## 2022-03-10 ENCOUNTER — HOSPITAL ENCOUNTER (EMERGENCY)
Dept: HOSPITAL 63 - ER | Age: 27
Discharge: HOME | End: 2022-03-10
Payer: OTHER GOVERNMENT

## 2022-03-10 VITALS — DIASTOLIC BLOOD PRESSURE: 68 MMHG | SYSTOLIC BLOOD PRESSURE: 108 MMHG

## 2022-03-10 VITALS — HEIGHT: 63 IN | BODY MASS INDEX: 21.99 KG/M2 | WEIGHT: 124.12 LBS

## 2022-03-10 DIAGNOSIS — R19.7: ICD-10-CM

## 2022-03-10 DIAGNOSIS — Z3A.11: ICD-10-CM

## 2022-03-10 DIAGNOSIS — O21.9: Primary | ICD-10-CM

## 2022-03-10 PROCEDURE — 99283 EMERGENCY DEPT VISIT LOW MDM: CPT

## 2022-03-10 NOTE — PHYS DOC
Past History


Past Medical History:  No Pertinent History


Past Surgical History:  No Surgical History


Additional Past Surgical Histo:  D&C


Alcohol Use:  None


Drug Use:  None





General Adult


EDM:


Chief Complaint:  VOMITING IN PREGNANCY





HPI:


HPI:





Patient is a 26-year-old female who presents with nausea and vomiting.  Palu cordero's son and daughter have both been sick with nausea vomiting and diarrhea 

for the last couple of days.  Mom states her symptoms started today.  Denies 

fevers.  Mom is also 11 weeks pregnant.  G5, P2.  Mom denies any abdominal pain 

or diarrhea.  No chest pain or shortness of breath.  G5, P2.  Mom denies taking 

anything at home for nausea.  Denies medical history.





Review of Systems:


Review of Systems:


ROS


At least 10 ROS systems have been reviewed and are negative except as documented

in the HPI.


General: Negative except as outlined in HPI above.


Skin: Negative except as outlined in HPI above.


HEENT: Negative except as outlined in HPI above.


Neck: Negative except as outlined in HPI above.


Respiratory: Negative except as outlined in HPI above..


Cardiovascular: Negative except as outlined in HPI above.


Abdomen: Negative except as outlined in HPI above.


: Negative except as outlined in HPI above.


Back/MSK: Negative except as outlined in HPI above.


Neuro: Negative except as outlined in HPI above.


Psych: Negative except as outlined in HPI above.





Current Medications:


Current Meds:





Current Medications








 Medications


  (Trade)  Dose


 Ordered  Sig/Neelima  Start Time


 Stop Time Status Last Admin


Dose Admin


 


 Ondansetron HCl


  (Zofran Odt)  4 mg  1X  ONCE  3/10/22 19:15


 3/10/22 19:16   














Allergies:


Allergies:





Allergies








Coded Allergies Type Severity Reaction Last Updated Verified


 


  No Known Drug Allergies    11/8/19 No











Physical Exam:


PE:





Constitutional: Well developed, well nourished, no acute distress, non-toxic 

appearance. []


HENT:  bilateral external ears normal, oropharynx moist, no oral exudates, nose 

normal. []


Eyes: PERRLA,  conjunctiva normal, no discharge. [] 


Neck: Normal range of motion, no tenderness, supple, no stridor. [] 


Cardiovascular:Heart rate regular rhythm, no murmur []


Lungs & Thorax:  Bilateral breath sounds clear to auscultation []


Abdomen: Bowel sounds normal, soft, no tenderness, no masses, no pulsatile 

masses. [] 


Skin: Warm, dry, no erythema, no rash. [] 


Back: No tenderness, no CVA tenderness. [] 


Extremities: No tenderness, no cyanosis, no clubbing, ROM intact, no edema. [] 


Neurologic: Alert and oriented X 3, normal motor function, normal sensory 

function, no focal deficits noted. []


Psychologic: Affect normal, judgement normal, mood normal. []





Current Patient Data:


Vital Signs:





                                   Vital Signs








  Date Time  Temp Pulse Resp B/P (MAP) Pulse Ox O2 Delivery O2 Flow Rate FiO2


 


3/10/22 18:42 97.9 84 18 106/67 (80) 99 Room Air  











EKG:


EKG:


[]





Radiology/Procedures:


Radiology/Procedures:


[]





Heart Score:


C/O Chest Pain:  No


Risk Factors:


Risk Factors:  DM, Current or recent (<one month) smoker, HTN, HLP, family 

history of CAD, obesity.


Risk Scores:


Score 0 - 3:  2.5% MACE over next 6 weeks - Discharge Home


Score 4 - 6:  20.3% MACE over next 6 weeks - Admit for Clinical Observation


Score 7 - 10:  72.7% MACE over next 6 weeks - Early Invasive Strategies





Course & Med Decision Making:


Course & Med Decision Making


Pertinent Labs and Imaging studies reviewed. (See chart for details)





[] 26-year-old female presents with nausea and vomiting.  Patient is G5, P2.  

Patient's son and daughter are both been sick the last couple of days with 

nausea vomiting and diarrhea.  Afebrile.  Hemodynamically stable.  Given Zofran 

while in the emergency room to help with symptoms.  Advised patient to follow-up

 with OB if symptoms continue.  Tylenol for fever.  Make sure drinking plenty of

 fluids to avoid dehydration.  Discussed return precautions.  Patient verbalizes

 understanding of discharge instructions.





Dragon Disclaimer:


Dragon Disclaimer:


This electronic medical record was generated, in whole or in part, using a voice

 recognition dictation system.





Departure


Departure:


Impression:  


   Primary Impression:  


   Nausea vomiting and diarrhea


Disposition:  01 HOME / SELF CARE / HOMELESS


Condition:  STABLE


Referrals:  


BLADIMIR SMITH (PCP)


Patient Instructions:  Nausea and Vomiting, Easy-to-Read





Additional Instructions:  


You are seen emergency room for nausea/vomiting.  You were given Zofran to help 

with symptoms.  If you start running fevers you can take Tylenol.  Make sure 

drinking plenty of fluids to help avoid dehydration.  Please follow-up with your

 OB for further management.  Return to emergency room for worsening symptoms or 

concerns.





EMERGENCY DEPARTMENT GENERAL DISCHARGE INSTRUCTIONS





Thank you for coming to [facility] (ED) today and trusting us

 with you 


care.  We trust that you had a positivie experience in our Emergency Department.

  If you 


wish to speak to the department management, you may call the director at 

[phone].





YOUR FOLLOW UP INSTRUCTIONS ARE AS FOLLOWS:





1.  Do you have a private Doctor?  If you do not have a private doctor, please 

ask for a 


resource list of physicians or clinics that may be able to assist you with 

follow up care.





2.  The Emergency Physician has interpreted your x-rays.  The X-Ray specialist 

will also 


review them.  If there is a change in the findings, you will be notified in 48 

hours when at 


all possible.





3.  A lab test or culture has been done, your results will be reviewed and you 

will be 


notified if you need a change in treatment.





ADDITIONAL INSTRUCTIONS AND INFORMATION:





1.  Your care today has been supervised by a physician who is specially trained 

in emergency 


care.  Many problems require more than one evaluation for a complete diagnosis 

and 


treatment.  We recommend that you schedule your follow up appointment as 

recommended to 


ensure complete treatment of you illness or injury.  If you are unable to obtain

 follow up 


care and continue to have a problem, or if your condition worsens, we recommend 

that you 


return to the ED.





2.  We are not able to safely determine your condition over the phone nor are we

 able to 


give sound medical advice over the phone.  For these safety reasons, if you call

 for medical 


advice we will ask you to come to the ED for further evaluation.





3.  If you have any questions regarding these discharge instructions please call

 the ED at 


[phone].





SAFETY INFORMATION:





In the interest of safety, wellness, and injury prevention; we encourage you to 

wear your 


sealbelt, if you smoke; quite smoking, and we encourage family to use a 

protective helmet 


for bicycling and other sporting events that present an increased risk for head 

injury.





IF YOUR SYMPTOMS WORSEN OR NEW SYMPTOMS DEVELOP, OR YOU HAVE CONCERNS ABOUT YOUR

 CONDITION; 


OR IF YOUR CONDITION WORSENS WHILE YOU ARE WAITING FOR YOUR FOLLOW UP 

APPOINTMENT; EITHER 


CONTACT YOUR PRIMARY CARE DOCTOR, THE PHYSICIAN WHOSE NAME AND NUMBER YOU WERE 

GIVEN, OR 


RETURN TO THE ED IMMEDIATELY.











NADIA BAIRD               Mar 10, 2022 19:25